# Patient Record
Sex: FEMALE | Race: WHITE | NOT HISPANIC OR LATINO | URBAN - METROPOLITAN AREA
[De-identification: names, ages, dates, MRNs, and addresses within clinical notes are randomized per-mention and may not be internally consistent; named-entity substitution may affect disease eponyms.]

---

## 2018-10-05 ENCOUNTER — EMERGENCY (EMERGENCY)
Facility: HOSPITAL | Age: 23
LOS: 0 days | Discharge: HOME | End: 2018-10-05
Attending: EMERGENCY MEDICINE | Admitting: EMERGENCY MEDICINE

## 2018-10-05 VITALS
SYSTOLIC BLOOD PRESSURE: 124 MMHG | HEART RATE: 89 BPM | TEMPERATURE: 97 F | DIASTOLIC BLOOD PRESSURE: 76 MMHG | RESPIRATION RATE: 18 BRPM

## 2018-10-05 DIAGNOSIS — F11.10 OPIOID ABUSE, UNCOMPLICATED: ICD-10-CM

## 2018-10-05 DIAGNOSIS — F17.210 NICOTINE DEPENDENCE, CIGARETTES, UNCOMPLICATED: ICD-10-CM

## 2018-10-05 DIAGNOSIS — J45.909 UNSPECIFIED ASTHMA, UNCOMPLICATED: ICD-10-CM

## 2018-10-05 NOTE — SBIRT NOTE. - NSSBIRTFULLSCREEN_GEN_A_ED_FT
SBIRT screening began but patient refused to complete screening or conduct brief intervention. Patient was primarily interested in discussing referral to detox.   Naloxone Rescue Kit offered but patient refused. She has kits at home.

## 2018-10-05 NOTE — ED PROVIDER NOTE - PHYSICAL EXAMINATION
CONSTITUTIONAL: Well-developed; well-nourished; in no acute distress.   SKIN: warm, dry; no piloerection   HEAD: Normocephalic; atraumatic.  EYES: no conj injection, normal pupils  ENT: No nasal discharge; airway clear.  CARD: S1, S2 normal; no murmurs, gallops, or rubs. Regular rate and rhythm.   RESP: No wheezes, rales or rhonchi.  ABD: soft ntnd  EXT: Normal ROM.    NEURO: Alert, oriented, grossly unremarkable  PSYCH: Cooperative, appropriate.

## 2018-10-05 NOTE — ED PROVIDER NOTE - PROGRESS NOTE DETAILS
I personally evaluated the patient. I reviewed the Resident’s or Physician Assistant’s note (as assigned above), and agree with the findings and plan except as documented in my note.   24 Y/O F H/O HEROIN ABUSE PRESENTS TO THE ED FOR "DETOX" PRIOR TO ENTERING A REHAB FACILITY IN [facility]. PT LAST USED HEROIN APPROX 30 HOURS AGO. PT DENIES ANY OTHER DRUG USE. NO ALCOHOL USE. PT WITH PRIOR REHAB ADMISSIONS. PT IN ED WITH FAMILY. VITALS NOTED. ALERT OX3 NAD. PUPILS 10MM. SKIN WARM AND DRY. LUNGS CLEAR B/L. RRR S1S2. ABD- SOFT NONTENDER. NEURO EXAM NONFOCAL. + TRACK MARKS. CASE D/W TOXICOLOGY. PT WITH COWS SCORE-6. PT OFFERED BUPRENORPHINE. PT REFUSED AND REQUESTS TO FOLLOW UP AT [facility] FOR REHAB TODAY. FAMILY AT BEDSIDE. WILL DISCHARGE.

## 2018-10-05 NOTE — ED PROVIDER NOTE - OBJECTIVE STATEMENT
24 y/o female with pmhx of substance abuse presents for detox from heroin. Patient states she last used 2gm of IV heroin 24 hours ago; since then, has been feeling chills, restlessness, and n/v x2 NBNB episodes. Patient 24 y/o female with pmhx of substance abuse presents for detox from heroin. Patient states she last used 2gm of IV heroin 24 hours ago; since then, has been feeling chills, restlessness, and n/v x2 NBNB episodes. Patient states she wants to go to a rehab center in [place], however needs to go through detox first. Denies recent cocaine use, alcohol use, marijuana use; denies other drug use. + cigarettes.

## 2018-10-05 NOTE — ED ADULT NURSE NOTE - NSIMPLEMENTINTERV_GEN_ALL_ED
Implemented All Universal Safety Interventions:  [place] to call system. Call bell, personal items and telephone within reach. Instruct patient to call for assistance. Room bathroom lighting operational. Non-slip footwear when patient is off stretcher. Physically safe environment: no spills, clutter or unnecessary equipment. Stretcher in lowest position, wheels locked, appropriate side rails in place.

## 2018-10-05 NOTE — ED PROVIDER NOTE - NS ED ROS FT
Constitutional: See HPI.  Cardiac: No chest pain, SOB or edema. No chest pain with exertion.  Respiratory: No cough or respiratory distress.   GI: No nausea, vomiting, diarrhea or abdominal pain.  MS: No myalgia, muscle weakness, joint pain or back pain.  Neuro: No headache or weakness. No LOC.  Skin: No skin rash.  Endo: No hx of DM, thyroid disease  Except as documented in HPI, all other review of systems is negative

## 2018-10-05 NOTE — ED PROVIDER NOTE - MEDICAL DECISION MAKING DETAILS
24 Y/O F HEROIN ABUSE PRESENTED TO ED FOR DETOX. PT OFFERED BUPRENOPRHINE AND REFUSED AND WILL FOLLOW UP AT REHAB FACILITY TODAY.